# Patient Record
Sex: MALE | Race: OTHER | HISPANIC OR LATINO | URBAN - METROPOLITAN AREA
[De-identification: names, ages, dates, MRNs, and addresses within clinical notes are randomized per-mention and may not be internally consistent; named-entity substitution may affect disease eponyms.]

---

## 2020-11-02 ENCOUNTER — OUTPATIENT (OUTPATIENT)
Dept: OUTPATIENT SERVICES | Facility: HOSPITAL | Age: 35
LOS: 1 days | End: 2020-11-02
Payer: SELF-PAY

## 2020-11-02 DIAGNOSIS — Z11.59 ENCOUNTER FOR SCREENING FOR OTHER VIRAL DISEASES: ICD-10-CM

## 2020-11-02 LAB — SARS-COV-2 RNA SPEC QL NAA+PROBE: SIGNIFICANT CHANGE UP

## 2020-11-02 PROCEDURE — U0003: CPT

## 2022-10-25 ENCOUNTER — EMERGENCY (EMERGENCY)
Facility: HOSPITAL | Age: 37
LOS: 1 days | Discharge: HOME | End: 2022-10-25
Attending: STUDENT IN AN ORGANIZED HEALTH CARE EDUCATION/TRAINING PROGRAM
Payer: SELF-PAY

## 2022-10-25 VITALS
HEIGHT: 68.11 IN | DIASTOLIC BLOOD PRESSURE: 93 MMHG | WEIGHT: 175.05 LBS | RESPIRATION RATE: 18 BRPM | OXYGEN SATURATION: 97 % | SYSTOLIC BLOOD PRESSURE: 150 MMHG | TEMPERATURE: 98 F | HEART RATE: 69 BPM

## 2022-10-25 PROCEDURE — 99285 EMERGENCY DEPT VISIT HI MDM: CPT

## 2022-10-25 PROCEDURE — 82962 GLUCOSE BLOOD TEST: CPT

## 2022-10-25 PROCEDURE — 99283 EMERGENCY DEPT VISIT LOW MDM: CPT

## 2022-10-25 NOTE — ED PROVIDER NOTE - NSFOLLOWUPINSTRUCTIONS_ED_ALL_ED_FT
Intoxicación alcohólica    Alcohol Intoxication      La intoxicación alcohólica ocurre cuando lj persona ya no piensa con claridad ni se desempeña chandler (experimenta un deterioro) después de consumir bebidas alcohólicas. La intoxicación alcohólica puede ocurrir tan solo con jl copa. El definición legal de la intoxicación alcohólica depende de la cantidad de alcohol en la erlinda (concentración de alcohol en la erlinda, SUZY). Jl SUZY de 80 a 100 mg/dl o mayor se considera legalmente mikey jl intoxicación alcohólica. El nivel de deterioro depende de lo siguiente:  •La cantidad de alcohol que la persona bebió.      •La edad, el sexo y el peso de la persona.      •La frecuencia con la que la persona lino.      •Si la persona tiene otras enfermedades, tales mikey diabetes, convulsiones o jl afección cardíaca.      La intoxicación alcohólica puede variar de leve a grave. La afección puede ser peligrosa, especialmente si la persona:  •También usa ciertas drogas ilegales y medicamentos recetados.    •Lino jl gran cantidad de alcohol en un periodo corto de tiempo (consume alcohol compulsivamente).  •En las mujeres, el consumo de alcohol compulsivo significa beber cuatro o más medidas de alcohol a la vez.      •En los hombres, el consumo de alcohol compulsivo significa beber reese o más medidas de alcohol a la vez.        Si usted o alguien a green alrededor parece estar embriagado, diga algo y actúe.      ¿Cuáles son las causas?    La causa de esta afección es el consumo de alcohol.      ¿Qué incrementa el riesgo?    Los siguientes factores pueden hacer que sea más propenso a contraer esta afección:  •Presión de los pares en los adultos jóvenes.      •Dificultad para manejar el estrés.      •Antecedentes de consumo excesivo de drogas o alcohol.      •Combinación de alcohol con drogas.      •Antecedentes familiares de consumo excesivo de alcohol o drogas.      •Peso corporal bajo.      •Consumo de alcohol compulsivo.        ¿Cuáles son los signos o síntomas?    Los síntomas de la intoxicación alcohólica pueden variar de jl persona a otra. Los síntomas pueden ser leves, moderados o graves.    Los síntomas de jl intoxicación alcohólica leve pueden incluir los siguientes:  •Sensación de relajación o somnolencia.      •Tener jl leve dificultad con la coordinación, el habla, la memoria o la atención.      Los síntomas de jl intoxicación alcohólica moderada pueden incluir los siguientes:  •Emociones extremas, mikey enojo o tristeza.      •Tener dificultad con la coordinación, el habla, la memoria o la atención.      Los síntomas de jl intoxicación alcohólica grave pueden incluir los siguientes:  •Tener jl seria dificultad con la coordinación, el habla, la memoria o la atención.      •Desmayo.      •Vómitos.      •Confusión.      •Respiración lenta.      •Coma.      La intoxicación alcohólica puede cambiar rápidamente de leve a grave. Puede causar coma o la muerte, especialmente en las personas que no están expuestas al alcohol con frecuencia.      ¿Cómo se diagnostica?    El médico le preguntará la cantidad y el tipo de bebida alcohólica que bebió. La intoxicación también puede diagnosticarse en función de:  •Los síntomas y los antecedentes médicos.      •Un examen físico.      •Un análisis de erlinda que mide la SUZY.      •El olor a alcohol en la respiración.        ¿Cómo se trata?    El tratamiento para la intoxicación por alcohol puede incluir:  •Ser controlado en un departamento de emergencias, hospital o centro de tratamiento hasta que la SUZY disminuya y sea seguro para usted regresar a green casa.      •Líquidos intravenosos (i.v.) para prevenir o tratar la pérdida de líquido en el cuerpo (deshidratación).      •Medicamentos para tratar las náuseas o los vómitos, o para eliminar el alcohol del cuerpo.      •Asesoramiento (intervención breve) sobre los peligros de consumir alcohol.      •Tratamiento para el trastorno por el consumo de sustancias.      •Oxigenoterapia o jl máquina para respirar (respirador).      La exposición a marcellus plazo (crónica) al alcohol puede tener efectos a marcellus plazo en el cerebro, el corazón y el sistema digestivo. Estos efectos pueden ser graves y pueden requerir tratamiento.      Siga estas indicaciones en green casa:       Comida y bebida    • No lucinda alcohol si:  •Green médico le indica no hacerlo.      •Está embarazada, puede estar embarazada o está tratando de quedar embarazada.      •No tiene la edad legal para beber (mayor de 21 años de edad en [place]).      •Está tomando medicamentos que no se deben karen con alcohol.      •Tiene jl afección médica y el alcohol la empeora.      •Tiene que conducir o realizar actividades que requieren que esté alerta.      •Tiene un trastorno por abuso de sustancias.      •Pregúntele al médico si el alcohol es seguro para usted. Si el médico le permite beber alcohol, limite la cantidad que marcy. Puede beber:  •De 0 a 1 medida por día para las mujeres.     • De 0 a 2 medidas por día para los hombres.   •Esté atento a la cantidad de alcohol que hay en las bebidas que marcy. En [place], jl medida equivale a jl botella de cerveza de 12 oz (355 ml), un vaso de vino de 5 oz (148 ml) o un vaso de jl bebida alcohólica de phani graduación de 1½ oz (44 ml).          •Evite beber alcohol con el estómago vacío.      •Manténgase hidratado. Lucinda suficiente líquido para mantener la orina de color amarillo pálido. Evite la cafeína, ya que puede deshidratarlo.      •Evite consumir más de jl bebida alcohólica por hora.      •Cuando lucinda más que jl medida de alcohol, lucinda agua o jl bebida sin alcohol entre las bebidas alcohólicas.      Indicaciones generales     •[place] los medicamentos de venta félix y los recetados solamente mikey se lo haya indicado el médico.      • No conduzca después de beber cualquier cantidad de alcohol. Organice un conductor designado u otra forma de volver a casa.      •[place] a alguna persona responsable que se quede con usted mientras está embriagado. No debería quedarse solo.      •Concurra a todas las visitas de seguimiento mikey se lo haya indicado el médico. [place] es importante.        Comuníquese con un médico si:    •No mejora luego de algunos días.      •Tiene problemas en el trabajo, la escuela o el hogar debido al consumo de alcohol.        Solicite ayuda de inmediato si:  •Tiene alguno de los siguientes síntomas:  •Grave dificultad con la coordinación, el habla, la memoria o la atención.      •Dificultad para mantenerse despierto.      •Confusión grave.      •Jl convulsión.      •Desvanecimiento.      •Desmayos.      •Vómitos con erlinda de color leiva brillante o jl sustancia parecida a la borra del café.      •Tiene erlinda en la materia fecal (heces). La erlinda puede hacer que las heces tengan color leiva brillante, color basilio o aspecto alquitranado. También pueden tener mal olor.      •Temblores al tratar de abandonar el hábito de beber.      •Pensamientos acerca de lastimarse a sí mismo o a otras personas.        Si alguna vez siente que puede lastimarse o lastimar a otras personas, o tiene pensamientos de poner fin a green álvaro, busque ayuda de inmediato. Puede dirigirse al servicio de emergencias más cercano o comunicarse con:   • El servicio de emergencias de green localidad (911 en [place]       • Jl línea de asistencia al suicida y atención en crisis, mikey National Suicide Prevention Lifeline (Línea Nacional de Prevención del Suicidio), al 1-139.771.5750. Está disponible las 24 horas del día.         Resumen    •La intoxicación alcohólica ocurre cuando jl persona ya no piensa con claridad ni se desempeña chandler después de consumir bebidas alcohólicas.      •Si el médico le dice que no corre riesgos al beber alcohol, limite green consumo a no más de 1 medida al día si es rickey (no lucinda si está embarazada) y 2 medidas si es hombre. Jl medida equivale a 12 onzas de cerveza, 5 onzas de vino o 1½ onzas de bebidas alcohólicas de phani graduación.      •Comuníquese con el médico si el consumo de alcohol le ha causado problemas en el trabajo, en la escuela o en green casa.      •Busque ayuda de inmediato si tiene pensamientos acerca de lastimarse a usted mismo o a otras personas.      Esta información no tiene mikey fin reemplazar el consejo del médico. Asegúrese de hacerle al médico cualquier pregunta que tenga.

## 2022-10-25 NOTE — ED ADULT NURSE NOTE - NS ED NURSE RECORD ANOTHER VITAL SIGN
Negative rapid strep  Benign exam   Likely viral   Continue Tylenol and Motrin  [place] gargles  We will check a throat culture 
Yes

## 2022-10-25 NOTE — ED ADULT TRIAGE NOTE - MODE OF ARRIVAL
Balloon inserted to left anterior descending, proximal left anterior descending and middle left anterior descending. Walk in [employer]

## 2022-10-25 NOTE — ED ADULT NURSE NOTE - OBJECTIVE STATEMENT
pt states he drank too much and his wife made him come to the hospital. pt is A&Ox2-3, stating it is October but the year is 2002. pt is alert and able to answer questions. pt is able to walk on his own.

## 2022-10-25 NOTE — ED ADULT TRIAGE NOTE - TEMPERATURE IN FAHRENHEIT (DEGREES F)
98.3 Price (Do Not Change): 0.00 Instructions: This plan will send the code FBSE to the PM system.  DO NOT or CHANGE the price. Detail Level: Simple

## 2022-10-25 NOTE — ED ADULT TRIAGE NOTE - CHIEF COMPLAINT QUOTE
Pt A&Ox4 states " I drank a bit too much tonight and wife called the police."  BIBA c/o alcohol intoxication. Patient denies and injury, no pain.

## 2022-10-25 NOTE — ED PROVIDER NOTE - OBJECTIVE STATEMENT
36 y/o male with no pertinent PMHx presents to ED s/p alcohol ingestion. Pt states pt drank a little to much tonight, wife called police on him. Pt states he is unsure why he is here. Pt reports having 5 children. Denies fever, chills, fall, trauma. Pt states he can get a taxi to go home, states he feels much better. Pt admits to 6 drinks per day, smokes marijuana.   : [place]

## 2022-10-25 NOTE — ED PROVIDER NOTE - PATIENT PORTAL LINK FT
You can access the FollowMyHealth Patient Portal offered by [facility] by registering at the following website: [email or web address]. By joining [employer]’s FollowMyHealth portal, you will also be able to view your health information using other applications (apps) compatible with our system.